# Patient Record
Sex: MALE | Race: WHITE | URBAN - NONMETROPOLITAN AREA
[De-identification: names, ages, dates, MRNs, and addresses within clinical notes are randomized per-mention and may not be internally consistent; named-entity substitution may affect disease eponyms.]

---

## 2018-05-26 ENCOUNTER — OFFICE VISIT (OUTPATIENT)
Dept: FAMILY MEDICINE | Facility: OTHER | Age: 38
End: 2018-05-26
Attending: PHYSICIAN ASSISTANT
Payer: COMMERCIAL

## 2018-05-26 VITALS
TEMPERATURE: 97.6 F | WEIGHT: 196.1 LBS | HEART RATE: 98 BPM | BODY MASS INDEX: 27.45 KG/M2 | SYSTOLIC BLOOD PRESSURE: 120 MMHG | DIASTOLIC BLOOD PRESSURE: 78 MMHG | HEIGHT: 71 IN

## 2018-05-26 DIAGNOSIS — S70.361A TICK BITE OF THIGH, RIGHT, INITIAL ENCOUNTER: Primary | ICD-10-CM
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
## 2018-05-26 PROCEDURE — 99202 OFFICE O/P NEW SF 15 MIN: CPT | Performed by: PHYSICIAN ASSISTANT

## 2018-05-26 RX ORDER — DOXYCYCLINE 100 MG/1
200 CAPSULE ORAL ONCE
Qty: 2 CAPSULE | Refills: 0 | Status: SHIPPED | OUTPATIENT
Start: 2018-05-26 | End: 2018-05-26

## 2018-05-26 RX ORDER — MUPIROCIN 20 MG/G
OINTMENT TOPICAL 3 TIMES DAILY
Qty: 15 G | Refills: 0 | Status: SHIPPED | OUTPATIENT
Start: 2018-05-26 | End: 2018-06-02

## 2018-05-26 ASSESSMENT — PAIN SCALES - GENERAL: PAINLEVEL: NO PAIN (0)

## 2018-05-26 NOTE — NURSING NOTE
TICK BITE  Hours tick attached: less than 24 hours  Location: right leg, hamstring  Head removed: no  Rash/itching: no  Swelling: yes  Fever: no  Chills: no  Muscle or joint pain: no  Headache: no  Swelling of lymph nodes: no  Annabelle Browning LPN .............5/26/2018  11:25 AM

## 2018-05-26 NOTE — PATIENT INSTRUCTIONS
Deer tick bite  Doxycycline 100 mg oral tablet, take 2 tablets once today  Wash area of tick bite with warm soapy water, cover with topical antibiotic ointment 2-3 times/ daily for 7-10 days  Follow up with PCP if symptoms persist or worsen  Seek immediate care for    Increasing redness around the bite site    Increased pain or swelling    Fever over 100.4 F (38 C), or as directed by your healthcare provider    Fluid draining from the bite area  Signs of tick-related disease. Watch for these over the next few weeks to months:    Circular, red, ring-like rash appears at the bite area within 1 to 3 weeks    Tiredness, fever or chills, nausea or vomiting    Neck pain or stiffness, headache, or confusion    Muscle or bone aches    Joint pain or swelling, especially in the knee    Weakness on one side of the face    Tick Bite, Antibiotic Treatment    Ticks are small arachnids that feed on the blood of rodents, rabbits, birds, deer, dogs and humans. The bite may cause a local reaction like that of a spider, with a small amount of local redness, itching and slight swelling. Sometimes there is no local reaction.  Most tick bites are harmless. But some ticks carry diseases, such as Lyme disease or Leo [place] spotted fever. These can be passed to people at the time of the bite. Lyme disease is of greatest concern. Right now you have no symptoms of Lyme disease or other serious reaction to the bite. It is important to watch for the warning signs, which could appear weeks to months after the tick bite.  Home care  The following guidelines can help you care for your bite at home:    If itching is a problem, avoid tight clothing and anything that heats up your skin. This includes hot showers or baths and direct sunlight. This often makes the itching worse.    An ice pack will reduce local areas of redness and itching. Make your own ice pack by putting ice cubes in a zip-top plastic bag and wrapping it in a thin towel.  Over-the-counter creams containing benzocaine may help with itching.    You can use an antihistamine with diphenhydramine if your doctor did not give you another antihistamine. This medicine may be used to reduce itching if large areas of the skin are involved. It is available at drugstores and grocery stores. If symptoms continue, talk with your doctor or pharmacist about other over-the counter medicines that may be helpful.    Your doctor may prescribe antibiotics to reduce your risk of getting Lyme disease. It is very important that you take them exactly as directed until they are completely finished.  Follow-up care  Follow up with your healthcare provider, or as advised.  Call 911  Call 911 if any of these occur:    Irregular or rapid heartbeat    Numbness, tingling, or weakness in the arms or legs  When to seek medical advice  Call your healthcare provider right away if any of these occur:  Signs of local infection. Watch for these during the next few days:    Increasing redness around the bite site    Increased pain or swelling    Fever over 100.4 F (38 C), or as directed by your healthcare provider    Fluid draining from the bite area  Signs of tick-related disease. Watch for these over the next few weeks to months:    Circular, red, ring-like rash appears at the bite area within 1 to 3 weeks    Tiredness, fever or chills, nausea or vomiting    Neck pain or stiffness, headache, or confusion    Muscle or bone aches    Joint pain or swelling, especially in the knee    Weakness on one side of the face  Date Last Reviewed: 10/1/2016    5045-0765 [employer]. [address] [place] [address], [place] [address]. All rights reserved. This information is not intended as a substitute for professional medical care. Always follow your healthcare professional's instructions.

## 2018-05-26 NOTE — MR AVS SNAPSHOT
After Visit Summary   5/26/2018    Chico Ortiz    MRN: [id number]           Patient Information     Date Of Birth          1980        Visit Information        Provider Department      5/26/2018 11:15 AM Nancy Yi PA [facility]        Today's Diagnoses     Tick bite of thigh, right, initial encounter    -  1      Care Instructions    Deer tick bite  Doxycycline 100 mg oral tablet, take 2 tablets once today  Wash area of tick bite with warm soapy water, cover with topical antibiotic ointment 2-3 times/ daily for 7-10 days  Follow up with PCP if symptoms persist or worsen  Seek immediate care for    Increasing redness around the bite site    Increased pain or swelling    Fever over 100.4 F (38 C), or as directed by your healthcare provider    Fluid draining from the bite area  Signs of tick-related disease. Watch for these over the next few weeks to months:    Circular, red, ring-like rash appears at the bite area within 1 to 3 weeks    Tiredness, fever or chills, nausea or vomiting    Neck pain or stiffness, headache, or confusion    Muscle or bone aches    Joint pain or swelling, especially in the knee    Weakness on one side of the face    Tick Bite, Antibiotic Treatment    Ticks are small arachnids that feed on the blood of rodents, rabbits, birds, deer, dogs and humans. The bite may cause a local reaction like that of a spider, with a small amount of local redness, itching and slight swelling. Sometimes there is no local reaction.  Most tick bites are harmless. But some ticks carry diseases, such as Lyme disease or Leo [place] spotted fever. These can be passed to people at the time of the bite. Lyme disease is of greatest concern. Right now you have no symptoms of Lyme disease or other serious reaction to the bite. It is important to watch for the warning signs, which could appear weeks to months after the tick bite.  Home care  The following guidelines can  help you care for your bite at home:    If itching is a problem, avoid tight clothing and anything that heats up your skin. This includes hot showers or baths and direct sunlight. This often makes the itching worse.    An ice pack will reduce local areas of redness and itching. Make your own ice pack by putting ice cubes in a zip-top plastic bag and wrapping it in a thin towel. Over-the-counter creams containing benzocaine may help with itching.    You can use an antihistamine with diphenhydramine if your doctor did not give you another antihistamine. This medicine may be used to reduce itching if large areas of the skin are involved. It is available at drugstores and grocery stores. If symptoms continue, talk with your doctor or pharmacist about other over-the counter medicines that may be helpful.    Your doctor may prescribe antibiotics to reduce your risk of getting Lyme disease. It is very important that you take them exactly as directed until they are completely finished.  Follow-up care  Follow up with your healthcare provider, or as advised.  Call 911  Call 911 if any of these occur:    Irregular or rapid heartbeat    Numbness, tingling, or weakness in the arms or legs  When to seek medical advice  Call your healthcare provider right away if any of these occur:  Signs of local infection. Watch for these during the next few days:    Increasing redness around the bite site    Increased pain or swelling    Fever over 100.4 F (38 C), or as directed by your healthcare provider    Fluid draining from the bite area  Signs of tick-related disease. Watch for these over the next few weeks to months:    Circular, red, ring-like rash appears at the bite area within 1 to 3 weeks    Tiredness, fever or chills, nausea or vomiting    Neck pain or stiffness, headache, or confusion    Muscle or bone aches    Joint pain or swelling, especially in the knee    Weakness on one side of the face  Date Last Reviewed: 10/1/2016     "[id number] [employer]. [address] [place] [address], [place] [address]. All rights reserved. This information is not intended as a substitute for professional medical care. Always follow your healthcare professional's instructions.                Follow-ups after your visit        Follow-up notes from your care team     Return if symptoms worsen or fail to improve.      Who to contact     If you have questions or need follow up information about today's clinic visit or your schedule please contact [facility] directly at [phone].  Normal or non-critical lab and imaging results will be communicated to you by MyChart, letter or phone within 4 business days after the clinic has received the results. If you do not hear from us within 7 days, please contact the clinic through MyChart or phone. If you have a critical or abnormal lab result, we will notify you by phone as soon as possible.  Submit refill requests through [employer] or call your pharmacy and they will forward the refill request to us. Please allow 3 business days for your refill to be completed.          Additional Information About Your Visit        Care EveryWhere ID     This is your Care EveryWhere ID. This could be used by other organizations to access your [facility] medical records  [id number]        Your Vitals Were     Pulse Temperature Height BMI (Body Mass Index)          98 97.6  F (36.4  C) (Tympanic) 5' 10.87\" (1.8 m) 27.45 kg/m2         Blood Pressure from Last 3 Encounters:   05/26/18 120/78    Weight from Last 3 Encounters:   05/26/18 196 lb 1.6 oz (89 kg)              Today, you had the following     No orders found for display         Today's Medication Changes          These changes are accurate as of 5/26/18 11:55 AM.  If you have any questions, ask your nurse or doctor.               Start taking these medicines.        Dose/Directions    doxycycline monohydrate 100 MG capsule   Used for:  Tick bite of " thigh, right, initial encounter   Started by:  Nancy Yi PA        Dose:  200 mg   Take 2 capsules (200 mg) by mouth once for 1 dose   Quantity:  2 capsule   Refills:  0       mupirocin 2 % ointment   Commonly known as:  BACTROBAN   Used for:  Tick bite of thigh, right, initial encounter   Started by:  Nancy Yi PA        Apply topically 3 times daily for 7 days   Quantity:  15 g   Refills:  0            Where to get your medicines      These medications were sent to [id number] - [place], [place] - [address]  [address], [facility] [address]     Phone:  [phone]     doxycycline monohydrate 100 MG capsule    mupirocin 2 % ointment                Primary Care Provider Fax #    Physician No Ref-Primary [phone]       No address on file        Equal Access to Services     GORGE WILLS : Hadii prema osodo [place], waaxda luqadaha, qaybta kaalmada carrillo, divine truong . So [facility] [phone].    ATENCIÓN: Si habla español, tiene a hogan disposición servicios gratuitos de asistencia lingüística. Llame al [phone].    We comply with applicable federal civil rights laws and [place] laws. We do not discriminate on the basis of race, color, national origin, age, disability, sex, sexual orientation, or gender identity.            Thank you!     Thank you for choosing [facility]  for your care. Our goal is always to provide you with excellent care. Hearing back from our patients is one way we can continue to improve our services. Please take a few minutes to complete the written survey that you may receive in the mail after your visit with us. Thank you!             Your Updated Medication List - Protect others around you: Learn how to safely use, store and throw away your medicines at www.disposemymeds.org.          This list is accurate as of 5/26/18 11:55 AM.  Always use your most recent med list.                    Brand Name Dispense Instructions for use Diagnosis    doxycycline monohydrate 100 MG capsule     2 capsule    Take 2 capsules (200 mg) by mouth once for 1 dose    Tick bite of thigh, right, initial encounter       mupirocin 2 % ointment    BACTROBAN    15 g    Apply topically 3 times daily for 7 days    Tick bite of thigh, right, initial encounter

## 2018-05-26 NOTE — PROGRESS NOTES
"SUBJECTIVE:  Chico Ortiz is a 37 year old male who is here because of a tick bite.   The tick was first noticed on the right posterior thigh this AM. It is embedded. He thinks it has been attached up to 24 hours. The tick is engorged.       Associated symptoms:  Fever: no noted fevers    Other symptoms:   Headache no  Sore throat no  Fatigue no  Nausea/vomiting no  Muscle aches no  Joint pain no  Swollen lymph glands no  Stiff neck no    History reviewed. No pertinent past medical history.  Current Outpatient Prescriptions   Medication     doxycycline monohydrate 100 MG capsule     mupirocin (BACTROBAN) 2 % ointment     No current facility-administered medications for this visit.         Allergies   Allergen Reactions     Peanuts [Nuts] Anaphylaxis     Other [Seasonal Allergies]            ROS:  See HPI      OBJECTIVE:  Nontoxic male in no acute distress.  Blood pressure 120/78, pulse 98, temperature 97.6  F (36.4  C), temperature source Tympanic, height 5' 10.87\" (1.8 m), weight 196 lb 1.6 oz (89 kg).     Rash description:     Location: posterior upper right thigh     Deer tick, engorged is attached, and was removed with tweezers in RC.      All of tick was removed. 4 mm area of erythema with a 2 mm open area in center where tick was removed.      No warmth, mildly TTP     Normal ROM right lower extremity.         ASSESSMENT / IMPRESSION:  (S70.361A,  W57.XXXA) Tick bite of thigh, right, initial encounter  (primary encounter diagnosis)      Plan: mupirocin (BACTROBAN) 2 % ointment, doxycycline        monohydrate 100 MG capsule    Deer tick bite - see AVS  Doxycycline 100 mg oral tablet, take 2 tablets once today  Wash area of tick bite with warm soapy water, cover with topical antibiotic ointment 2-3 times/ daily for 7-10 days  Follow up with PCP if symptoms persist or worsen  Patient received verbal and written instruction including review of warning signs    SAVANNAH Griffith on 5/26/2018 at 7:24 PM    "